# Patient Record
Sex: FEMALE | NOT HISPANIC OR LATINO | URBAN - METROPOLITAN AREA
[De-identification: names, ages, dates, MRNs, and addresses within clinical notes are randomized per-mention and may not be internally consistent; named-entity substitution may affect disease eponyms.]

---

## 2017-11-01 ENCOUNTER — IMPORTED ENCOUNTER (OUTPATIENT)
Dept: URBAN - METROPOLITAN AREA CLINIC 1 | Facility: CLINIC | Age: 62
End: 2017-11-01

## 2017-11-01 PROBLEM — H52.4: Noted: 2017-11-01

## 2017-11-01 PROCEDURE — S0621 ROUTINE OPHTHALMOLOGICAL EXA: HCPCS

## 2017-11-01 NOTE — PATIENT DISCUSSION
1.  Presbyopia: Rx was given for corrective spectacles if indicated. 2.  Return for an appointment in 1 year for 42 and Contact lens check. with Dr. Shaun Rushing.

## 2018-08-28 NOTE — PATIENT DISCUSSION
[address] IS VISUALLY SIGNIFICANT. DISCUSSED BLEPH SX AND PT WISHES TO WAIT AT THIS MOMENT.  PT TO CALL WHEN READY TO SCHEDULE

## 2018-11-07 ENCOUNTER — IMPORTED ENCOUNTER (OUTPATIENT)
Dept: URBAN - METROPOLITAN AREA CLINIC 1 | Facility: CLINIC | Age: 63
End: 2018-11-07

## 2018-11-07 PROBLEM — H52.13: Noted: 2018-11-07

## 2018-11-07 PROBLEM — H52.4: Noted: 2018-11-07

## 2018-11-07 PROCEDURE — S0621 ROUTINE OPHTHALMOLOGICAL EXA: HCPCS

## 2018-11-07 NOTE — PATIENT DISCUSSION
1. Myopia OU -- Finalized Glasses MRx was given to patient today for correction if indicated and requested2. Presbyopia OU3. Cataracts OU -- Observe 4. Dry Eyes OU -- Recommend the frequent use of OTC AT's BID-QID OU (sample given)Finalized CTL Rx & given to patient today. Return for an appointment in 1 YR for a 36 / CC OU with Dr. Sarahy Martines.

## 2019-02-04 NOTE — PATIENT DISCUSSION
MEIBOMIAN GLAND DYSFUNCTION, OU:  PRESCRIBE WARM COMPRESSES AND EYELID SCRUBS QD-BID, ARTIFICIAL TEARS BID-QID. RETURN FOR FOLLOW-UP AS SCHEDULED.

## 2019-02-04 NOTE — PATIENT DISCUSSION
EXTERNAL HORDEOLUM RLL: PRESCRIBED WARM COMPRESSES, EYELID SCRUBS AND MAXITROL EDITH BID. CALL/RTC IF SYMPTOMS PERSIST- DISCUSSED REFERRAL BACK TO DR. Tonya Hughes IF LESION PERSISTS.

## 2019-02-04 NOTE — PATIENT DISCUSSION
New Prescription: Maxitrol (neomycin-polymyxin-dexameth): ointment: 3.5-10,000-0.1 mg-unit/g-% 1 a small amount twice a day on eyelid 02-

## 2019-02-12 NOTE — PATIENT DISCUSSION
CHALAZION RLL: I have explained to the patient that a chalazion is a mass associated with inflammation, obstruction, or retained secretions of one or more of the glands that lubricates the edge of the eyelids. Symptoms may include pain, inflammation, and drainage of the mass. This may resolve on its own or may require excision. I have discussed the options of removal versus following. The risks, benefits, alternatives include anesthesia, bleeding, infection, inflammation, swelling, bruising and scarring. The patient understands and desires to remove Chalazion-RLL. Gave patient RX for Erythromycin. Apply to affected area twice a day for 1 week.

## 2019-02-12 NOTE — PATIENT DISCUSSION
Continue: neomycin-polymyxin-dexameth (neomycin-polymyxin-dexameth): ointment: 3.5-10,000-0.1 mg-unit/g-% 02-

## 2019-02-12 NOTE — PATIENT DISCUSSION
Continue: Maxitrol (neomycin-polymyxin-dexameth): ointment: 3.5-10,000-0.1 mg-unit/g-% 1 a small amount twice a day on eyelid 02-

## 2019-06-07 NOTE — PATIENT DISCUSSION
CONJUNCTIVITIS (ACUTE), OS:  PRESCRIBE TOBRADEX TID x 5 days then D/C   RETURN FOR FOLLOW-UP AS SCHEDULED- DUE FOR EC WITH DR. GREGORY AT THE END OF THE MONTH. RTC AS SCHEDULED OR SOONER PRN.

## 2019-06-07 NOTE — PATIENT DISCUSSION
New Prescription: TobraDex (tobramycin-dexamethasone): drops,suspension: 0.3-0.1% 1 drop four times a day as directed into left eye 06-

## 2021-03-11 ENCOUNTER — IMPORTED ENCOUNTER (OUTPATIENT)
Dept: URBAN - METROPOLITAN AREA CLINIC 1 | Facility: CLINIC | Age: 66
End: 2021-03-11

## 2021-03-11 PROBLEM — H52.13: Noted: 2021-03-11

## 2021-03-11 PROBLEM — H52.223: Noted: 2021-03-11

## 2021-03-11 PROBLEM — H52.4: Noted: 2021-03-11

## 2021-03-11 PROCEDURE — S0621 ROUTINE OPHTHALMOLOGICAL EXA: HCPCS

## 2021-03-11 NOTE — PATIENT DISCUSSION
1. Myopia- Rx was given for correction if indicated and requested. 2. Astigmatism OU3. Presbyopia4. Cataracts OU- Observe 5. Dry Eyes OU- Recommend the frequent use of AT's BID-QID OU routinely. Elizabeth Stevens for an appointment in 1 yr 40/CC with Dr. Gilbert Bhat.

## 2022-02-24 NOTE — PATIENT DISCUSSION
Recommended warm compresses 4-5x/day LLL, erythromycin michael BID. Refer to Dr. Nelson Sutherland for removal if persistent.

## 2022-03-31 NOTE — PATIENT DISCUSSION
The patient has presented with one or more eyelid or facial lesions with growth, change, irritation or abnormal appearance. A biopsy with pathology is recommended for definitive diagnosis. The patient has been given post-procedure written instructions. The patient understands that the results of the pathology report will be available after one week to two weeks. The patient understands that an attempt to call and inform them of the result will be made within 2 weeks. The patient was instructed to call our office if they do not receive a call from us after two weeks. No

## 2022-03-31 NOTE — PATIENT DISCUSSION
Referring patient to Dr. Tadeo Pruitt for second opinion and possible biopsy. Patient has history of basal cell and there is some hardening of the bump that seems suspicious.

## 2022-03-31 NOTE — PROCEDURE NOTE: CLINICAL
PROCEDURE NOTE: Excision of Eyelid Lesion OD. Diagnosis: Pinguecula. Anesthesia: Topical. Prep: Betadine Flush. Prior to treatment, the risks/benefits/alternatives were discussed. The patient wished to proceed with procedure. Local anesthetic was given. The eyelid was prepped and draped for procedure. The lesion was incised and removed. The wound was repaired with sutures. Patient tolerated procedure well. There were no complications. Post procedure instructions given. Cherie Munoz

## 2022-04-08 ASSESSMENT — VISUAL ACUITY
OS_SC: 20/20
OD_CC: J1+
OD_SC: 20/20
OD_SC: 20/20
OS_SC: J1+
OS_SC: J1+
OS_CC: J1+
OD_SC: 20/20

## 2022-04-08 ASSESSMENT — TONOMETRY
OS_IOP_MMHG: 16
OS_IOP_MMHG: 16
OD_IOP_MMHG: 16
OS_IOP_MMHG: 16
OD_IOP_MMHG: 15
OD_IOP_MMHG: 16

## 2022-06-03 NOTE — PATIENT DISCUSSION
Patient presents with suspicious lesion involving left lower eyelid. Recommend biopsy today. Patient expressed understanding and wishes to proceed.  The procedure was performed without difficulty.  The patient tolerated the procedure well and was given written post operative instructions.  A specimen was sent to pathology for further evaluation and the patient was advised to call our office if she has not received the results within 7-10 days.

## 2022-06-03 NOTE — PROCEDURE NOTE: CLINICAL
PROCEDURE NOTE: Biopsy of Eyelid Left Lower Lid. 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 Anesthesia: Local. Risks, benefits and alternatives discussed. Patient desires to proceed with biopsy today. A drop of proparacaine was instilled in the involved eye. Involved area was prepped using alcohol wipe and anesthetized using 1% lidocaine with epi. Lesion was excised using mini Kailyn scissors and forceps and placed in formalin to be sent for histopathology. Wound was cauterized to achieve hemostasis and erythromycin ophthalmic ointment was applied. The patient tolerated the procedure well and left in good condition. Post op instructions were given to the patient. The patient understands to call us for any concerns. Melissa Hutton

## 2022-09-23 NOTE — PATIENT DISCUSSION
Discussed r/b of opening the [facility] Flap at today's appointment.  Patient expressed understanding and wishes to proceed.  The procedure was performed without difficulty and the patient tolerated the procedure well.  Patient was given written post-operative instructions.

## 2022-09-23 NOTE — PROCEDURE NOTE: CLINICAL
PROCEDURE NOTE: Separation of Fracne / CB OS. After discussing risks, benefits and alternatives, patient has elected to proceed with the procedure and an informed consent was obtained. Prior to commencing surgery, patient identification, surgical procedure, site, and side with a time out were confirmed by the physician. The patient was prepared and draped in the usual sterile manner. [place] scissors were used to remove the tarsal-conjunctival flap from both the lower eyelid border and the superior tarsal border. Hemostasis was carefully achieved. Patient tolerated procedure well. There were no complications. The surgical site was treated with antibiotic ointment. The patient was given appropriate post-op care instructions and a prescription for antibiotic ointment. Armando Cameron

## 2022-10-14 NOTE — PATIENT DISCUSSION
Patient presents with persistent foreign body sensation following opening of [place] flap. Upon exam, cornea is clear and patient has good lid closure.  Follow up in 3 months for continued observation.

## 2022-10-14 NOTE — PATIENT DISCUSSION
Cornea is clear; potential scar tissue; warm compresses twice daily, AT gtts during day, gel drops at night. Patient has not been compliant with lubrication. Mucoid discharge still normal at this point.

## 2022-11-23 NOTE — PATIENT DISCUSSION
Georgann Merlin INTRAOCULAR PRESSURE AND OCT IS WITHIN ACCEPTABLE LIMITS. GONIO DONE TODAY TO DOCUMENT ANGLES. NO DROP THERAPY NEEDED AT THIS TIME.

## 2022-11-23 NOTE — PATIENT DISCUSSION
PRESCRIBE WARM COMPRESSES AND EYELID SCRUBS QD-BID, ARTIFICIAL TEARS BID-QID, AND ERYTHROMYCIN OPHTHALMIC OINTMENT EVERY NIGHT AS NEEDED. RETURN FOR FOLLOW-UP AS SCHEDULED. Statement Selected

## 2022-12-01 ENCOUNTER — COMPREHENSIVE EXAM (OUTPATIENT)
Dept: URBAN - METROPOLITAN AREA CLINIC 1 | Facility: CLINIC | Age: 67
End: 2022-12-01

## 2022-12-01 PROCEDURE — 92015 DETERMINE REFRACTIVE STATE: CPT

## 2022-12-01 PROCEDURE — 92310 CONTACT LENS FITTING OU: CPT

## 2022-12-01 PROCEDURE — 99214 OFFICE O/P EST MOD 30 MIN: CPT

## 2022-12-01 ASSESSMENT — VISUAL ACUITY
OU_CC: J1+
OD_CC: J1
OS_CC: J1
OS_CC: 20/20
OD_CC: 20/20
OD_BAT: 20/30
OS_BAT: 20/30

## 2022-12-01 ASSESSMENT — TONOMETRY
OD_IOP_MMHG: 14
OS_IOP_MMHG: 15

## 2022-12-01 NOTE — PATIENT DISCUSSION
-2.00 [facility] CTL given of OD, finalized CTL RX as well. Transitions of Care Status:  1.  Name of PCP: Dr. Isaias Rocha  2.  PCP Contacted on Admission: [ ] Y    [X] N    3.  PCP contacted at Discharge: [ ] Y    [ ] N    [ ] N/A  4.  Post-Discharge Appointment Date and Location:  5.  Summary of Handoff given to PCP: Code Status: DNR, MOLST in chart  DVT ppx: SCDs  Diet: advance as tolerated and more awake    Transitions of Care Status:  1.  Name of PCP: Dr. Isaias Rocha  2.  PCP Contacted on Admission: [ ] Y    [X] N    3.  PCP contacted at Discharge: [ ] Y    [ ] N    [ ] N/A  4.  Post-Discharge Appointment Date and Location:  5.  Summary of Handoff given to PCP:

## 2022-12-09 NOTE — PATIENT DISCUSSION
Patient presents w/ a significant amount of excess upper eyelid tissue involving the right upper eyelid.  Reviewed the r/b of an in office blepharoplasty.  Patient expressed understanding and wishes to proceed.  The procedure was performed without difficulty and the patient tolerated the procedure well.  The patient was given written post operative instructions and advised to follow up with our office in one week to have the sutures removed.

## 2022-12-21 NOTE — PATIENT DISCUSSION
Patient presents w/ a retained suture involving the left upper eyelid s/p touch up in office blepharoplasty.  The suture was removed today without difficult.  Recommend continued post operative course.  Follow up prn.

## 2024-11-01 ENCOUNTER — COMPREHENSIVE EXAM (OUTPATIENT)
Dept: URBAN - METROPOLITAN AREA CLINIC 1 | Facility: CLINIC | Age: 69
End: 2024-11-01

## 2024-11-01 DIAGNOSIS — H25.813: ICD-10-CM

## 2024-11-01 DIAGNOSIS — H04.123: ICD-10-CM

## 2024-11-01 DIAGNOSIS — Z46.0: ICD-10-CM

## 2024-11-01 DIAGNOSIS — H52.13: ICD-10-CM

## 2024-11-01 PROCEDURE — 92015 DETERMINE REFRACTIVE STATE: CPT

## 2024-11-01 PROCEDURE — 92310-3 LEVEL 3 SOFT LENS UPDATE

## 2024-11-01 PROCEDURE — 99214 OFFICE O/P EST MOD 30 MIN: CPT

## 2025-07-16 NOTE — PATIENT DISCUSSION
UV Protection You can access the FollowMyHealth Patient Portal offered by [facility] by registering at the following website: [email or web address]. By joining [employer]’s FollowMyHealth portal, you will also be able to view your health information using other applications (apps) compatible with our system.